# Patient Record
Sex: MALE | Employment: FULL TIME | URBAN - METROPOLITAN AREA
[De-identification: names, ages, dates, MRNs, and addresses within clinical notes are randomized per-mention and may not be internally consistent; named-entity substitution may affect disease eponyms.]

---

## 2024-07-05 ENCOUNTER — VIRTUAL VISIT (OUTPATIENT)
Dept: PEDIATRICS | Facility: CLINIC | Age: 44
End: 2024-07-05
Payer: COMMERCIAL

## 2024-07-05 DIAGNOSIS — J06.9 UPPER RESPIRATORY TRACT INFECTION, UNSPECIFIED TYPE: Primary | ICD-10-CM

## 2024-07-05 PROCEDURE — 99203 OFFICE O/P NEW LOW 30 MIN: CPT | Mod: 93 | Performed by: STUDENT IN AN ORGANIZED HEALTH CARE EDUCATION/TRAINING PROGRAM

## 2024-07-05 RX ORDER — IPRATROPIUM BROMIDE 42 UG/1
2 SPRAY, METERED NASAL 4 TIMES DAILY
Qty: 5 ML | Refills: 0 | Status: SHIPPED | OUTPATIENT
Start: 2024-07-05 | End: 2024-07-12

## 2024-07-05 NOTE — PROGRESS NOTES
Ryne is a 44 year old who is being evaluated via a billable telephone visit.    What phone number would you like to be contacted at? [phone]  How would you like to obtain your AVS? Mail a copy  Originating Location (pt. Location): Home    Distant Location (provider location):  On-site    Assessment & Plan     Upper respiratory tract infection, unspecified type  Prensents today for evaluation of cough, congestion and conjunctivitis after recent travels.  Symptoms most consistent with URI.  No red fld symptoms.  No fevers, shortness of breath.  Discussed supportive cares and otc medication recommendation.  Also discussed addition of atrovent nasal spray to help with nasal congestion and associated headache  - ipratropium (ATROVENT) 0.06 % nasal spray; Spray 2 sprays into both nostrils 4 times daily for 7 days              Subjective   Ryne is a 44 year old, presenting for the following health issues:  Sinus Problem    History of Present Illness       Reason for visit:  Sinus problem    He eats 4 or more servings of fruits and vegetables daily.He consumes 0 sweetened beverage(s) daily.He exercises with enough effort to increase his heart rate 30 to 60 minutes per day.  He exercises with enough effort to increase his heart rate 7 days per week.   He is taking medications regularly.       Last week in [place] doing a service project    Driving back Friday sore throat  Monday sinus pressure  Wednesday eye drainage   Cough since Friday or Saturday   Pretty consistent throughout the day    No fevers  Does feel generally somewhat achy  No shortness of breath  Taking ibuprofen 200 and tylenol 500 one each which have been helpful but not in the past day.  4 hours apart.    Notes increased frontal sinus pressure   No ear pain   No issues breathing through nose     He did not take a covid test     One of his kids is also showing symptoms of illness                 Objective           Vitals:  No vitals were obtained today  due to virtual visit.    Physical Exam   General: Alert and no distress //Respiratory: No audible wheeze, cough, or shortness of breath // Psychiatric:  Appropriate affect, tone, and pace of words            Phone call duration: 15 minutes  Signed Electronically by: Franci Ryan MD

## 2024-07-05 NOTE — PATIENT INSTRUCTIONS
Sorry you aren't feeling well.  I have sent a prescription for atrovent nasal spray to your pharmacy.  Please let us know if symptoms are getting worse or are not getting better.

## 2024-07-11 ENCOUNTER — MYC MEDICAL ADVICE (OUTPATIENT)
Dept: PEDIATRICS | Facility: CLINIC | Age: 44
End: 2024-07-11
Payer: COMMERCIAL

## 2024-07-11 NOTE — TELEPHONE ENCOUNTER
I would not expect atrovent nasal spray to cause those symptoms, this seems unrelated. Would recommend additional triage vs visit for new concern.    Thank you,  Franci Ryan MD

## 2024-07-11 NOTE — TELEPHONE ENCOUNTER
Dr. Ryan,    Please see [facility] message from pt and advise     Thank you  Sanjeev Ruvalcaba RN on 7/11/2024 at 2:54 PM

## 2024-07-12 ENCOUNTER — OFFICE VISIT (OUTPATIENT)
Dept: URGENT CARE | Facility: URGENT CARE | Age: 44
End: 2024-07-12
Payer: COMMERCIAL

## 2024-07-12 VITALS
BODY MASS INDEX: 22.16 KG/M2 | OXYGEN SATURATION: 100 % | TEMPERATURE: 97.2 F | DIASTOLIC BLOOD PRESSURE: 88 MMHG | WEIGHT: 182 LBS | SYSTOLIC BLOOD PRESSURE: 132 MMHG | RESPIRATION RATE: 17 BRPM | HEIGHT: 76 IN | HEART RATE: 65 BPM

## 2024-07-12 DIAGNOSIS — J01.90 ACUTE SINUSITIS WITH SYMPTOMS > 10 DAYS: ICD-10-CM

## 2024-07-12 DIAGNOSIS — R07.0 THROAT PAIN: Primary | ICD-10-CM

## 2024-07-12 DIAGNOSIS — J06.9 VIRAL URI: ICD-10-CM

## 2024-07-12 LAB
DEPRECATED S PYO AG THROAT QL EIA: NEGATIVE
GROUP A STREP BY PCR: NOT DETECTED

## 2024-07-12 PROCEDURE — 99213 OFFICE O/P EST LOW 20 MIN: CPT | Performed by: FAMILY MEDICINE

## 2024-07-12 PROCEDURE — 87651 STREP A DNA AMP PROBE: CPT | Performed by: FAMILY MEDICINE

## 2024-07-12 RX ORDER — DOXYCYCLINE 100 MG/1
100 CAPSULE ORAL 2 TIMES DAILY
Qty: 14 CAPSULE | Refills: 0 | Status: SHIPPED | OUTPATIENT
Start: 2024-07-12 | End: 2024-07-19

## 2024-07-12 NOTE — PROGRESS NOTES
Chief Complaint   Patient presents with    Urgent Care    Sinus Problem     2 weeks of sinus pressure and cough was given Arovent has used the last dose today with no relief.      Ryne was seen today for urgent care and sinus problem.    Diagnoses and all orders for this visit:    Throat pain  -     Streptococcus A Rapid Screen w/Reflex to PCR  -     Group A Streptococcus PCR Throat Swab    Acute sinusitis with symptoms > 10 days  -     doxycycline hyclate (VIBRAMYCIN) 100 MG capsule; Take 1 capsule (100 mg) by mouth 2 times daily for 7 days    Viral URI    Continue decongestant with antihistamine medication such as claritin D then do saline nasal spray wait 10 minutes and then do the Flonase  If any worsening  pain or high fever do follow up   Reviewed results with patient in details   As allergic to penicillin will treat with doxy for the sinus infection   Results for orders placed or performed in visit on 07/12/24   Streptococcus A Rapid Screen w/Reflex to PCR     Status: Normal    Specimen: Throat; Swab   Result Value Ref Range    Group A Strep antigen Negative Negative       D/d- uri, sinusitis ,pneumonia , covid     SUBJECTIVE:  Ryne Smith is a 44 year old male here with concerns about sinus infection and sorethroat and thick post nasal drip and cough.  He states onset of symptoms were 2 week(s) ago.  He has had maxillary, frontal pressure. Course of illness is worsening. Severity moderate Current and Associated symptoms: nasal congestion, sore throat, facial pain/pressure, headache, and post-nasal drainage  Predisposing factors include recent illness. Recent treatment has included: atrovent spray     No past medical history on file.  Social History     Tobacco Use    Smoking status: Never    Smokeless tobacco: Never   Substance Use Topics    Alcohol use: Yes     Alcohol/week: 3.0 standard drinks of alcohol     Types: 3 Standard drinks or equivalent per week       ROS:  Review of systems negative except as  "stated above.    OBJECTIVE:  /88 (BP Location: Left arm, Patient Position: Sitting, Cuff Size: Adult Large)   Pulse 65   Temp 97.2  F (36.2  C) (Tympanic)   Resp 17   Ht 1.93 m (6' 4\")   Wt 82.6 kg (182 lb)   SpO2 100%   BMI 22.15 kg/m    Exam:GENERAL APPEARANCE: healthy, alert and no distress  EYES: EOMI,  PERRL, conjunctiva clear  HENT: ear canals and TM fluid behind the TM .  Nose and mouth without ulcers, erythema or lesions  NECK: supple, nontender, no lymphadenopathy  RESP: lungs clear to auscultation - no rales, rhonchi or wheezes  CV: regular rates and rhythm, normal S1 S2, no murmur noted  PSYCH: mentation appears normal    Nelly Valdovinos MD     "

## 2024-07-12 NOTE — PATIENT INSTRUCTIONS
Continue decongestant with antihistamine medication such as claritin D then do saline nasal spray wait 10 minutes and then do the Flonase  If any worsening  pain or high fever do follow up       Nelly Valdovinos MD

## 2024-07-16 ENCOUNTER — OFFICE VISIT (OUTPATIENT)
Dept: FAMILY MEDICINE | Facility: CLINIC | Age: 44
End: 2024-07-16
Payer: COMMERCIAL

## 2024-07-16 VITALS
DIASTOLIC BLOOD PRESSURE: 90 MMHG | RESPIRATION RATE: 15 BRPM | BODY MASS INDEX: 22.14 KG/M2 | TEMPERATURE: 98.1 F | SYSTOLIC BLOOD PRESSURE: 137 MMHG | OXYGEN SATURATION: 98 % | WEIGHT: 181.8 LBS | HEART RATE: 67 BPM | HEIGHT: 76 IN

## 2024-07-16 DIAGNOSIS — R53.83 FATIGUE, UNSPECIFIED TYPE: ICD-10-CM

## 2024-07-16 DIAGNOSIS — J06.9 UPPER RESPIRATORY TRACT INFECTION, UNSPECIFIED TYPE: Primary | ICD-10-CM

## 2024-07-16 LAB
BASOPHILS # BLD AUTO: 0 10E3/UL (ref 0–0.2)
BASOPHILS NFR BLD AUTO: 1 %
EOSINOPHIL # BLD AUTO: 0.1 10E3/UL (ref 0–0.7)
EOSINOPHIL NFR BLD AUTO: 1 %
ERYTHROCYTE [DISTWIDTH] IN BLOOD BY AUTOMATED COUNT: 11.7 % (ref 10–15)
HCT VFR BLD AUTO: 48.3 % (ref 40–53)
HGB BLD-MCNC: 16.6 G/DL (ref 13.3–17.7)
IMM GRANULOCYTES # BLD: 0 10E3/UL
IMM GRANULOCYTES NFR BLD: 0 %
LYMPHOCYTES # BLD AUTO: 2 10E3/UL (ref 0.8–5.3)
LYMPHOCYTES NFR BLD AUTO: 34 %
MCH RBC QN AUTO: 28.9 PG (ref 26.5–33)
MCHC RBC AUTO-ENTMCNC: 34.4 G/DL (ref 31.5–36.5)
MCV RBC AUTO: 84 FL (ref 78–100)
MONOCYTES # BLD AUTO: 0.4 10E3/UL (ref 0–1.3)
MONOCYTES NFR BLD AUTO: 6 %
MONOCYTES NFR BLD AUTO: NEGATIVE %
NEUTROPHILS # BLD AUTO: 3.3 10E3/UL (ref 1.6–8.3)
NEUTROPHILS NFR BLD AUTO: 58 %
PLATELET # BLD AUTO: 179 10E3/UL (ref 150–450)
RBC # BLD AUTO: 5.75 10E6/UL (ref 4.4–5.9)
WBC # BLD AUTO: 5.7 10E3/UL (ref 4–11)

## 2024-07-16 PROCEDURE — 85025 COMPLETE CBC W/AUTO DIFF WBC: CPT | Performed by: FAMILY MEDICINE

## 2024-07-16 PROCEDURE — 36415 COLL VENOUS BLD VENIPUNCTURE: CPT | Performed by: FAMILY MEDICINE

## 2024-07-16 PROCEDURE — 99214 OFFICE O/P EST MOD 30 MIN: CPT | Performed by: FAMILY MEDICINE

## 2024-07-16 PROCEDURE — 86308 HETEROPHILE ANTIBODY SCREEN: CPT | Performed by: FAMILY MEDICINE

## 2024-07-16 PROCEDURE — 84443 ASSAY THYROID STIM HORMONE: CPT | Performed by: FAMILY MEDICINE

## 2024-07-16 RX ORDER — AZITHROMYCIN 250 MG/1
TABLET, FILM COATED ORAL
Qty: 6 TABLET | Refills: 0 | Status: SHIPPED | OUTPATIENT
Start: 2024-07-16 | End: 2024-07-21

## 2024-07-16 ASSESSMENT — PAIN SCALES - GENERAL: PAINLEVEL: MODERATE PAIN (4)

## 2024-07-16 NOTE — PROGRESS NOTES
Assessment & Plan     Upper respiratory tract infection, unspecified type  Will add azithromycin given return of symptoms, continue Doxycycline through course.  Follow up if not improving.  - azithromycin (ZITHROMAX) 250 MG tablet; Take 2 tablets (500 mg) by mouth daily for 1 day, THEN 1 tablet (250 mg) daily for 4 days.    Fatigue, unspecified type  Will check labs for abnormalities, recommendations pending results.  - CBC with platelets and differential; Future  - TSH with free T4 reflex; Future  - Mononucleosis screen; Future  - CBC with platelets and differential  - TSH with free T4 reflex  - Mononucleosis screen    See Patient Instructions    Jed Michele is a 44 year old, presenting for the following health issues:  Hospital F/U      7/16/2024     3:44 PM   Additional Questions   Roomed by Liliana Lu     History of Present Illness       Reason for visit:  Sinus problem    He eats 4 or more servings of fruits and vegetables daily.He consumes 0 sweetened beverage(s) daily.He exercises with enough effort to increase his heart rate 30 to 60 minutes per day.  He exercises with enough effort to increase his heart rate 7 days per week.   He is taking medications regularly.         ED/UC Followup:    Facility:  [facility]  Date of visit: 7/12/24  Reason for visit: URI / Sinus Problem  Current Status: No Change  Acute Illness  Acute illness concerns: Sinus problem/URI  Onset/Duration: 2 weeks  Symptoms:  Fever: YES- possibly lo grade fever on saturday  Chills/Sweats: YES  Headache (location?): YES  Sinus Pressure: YES  Conjunctivitis:  YES  Ear Pain: no  Rhinorrhea: YES- at first but not currently  Congestion: YES  Sore Throat: YES  Cough: YES-non-productive  Wheeze: No  Decreased Appetite: YES  Nausea: No  Vomiting: No  Diarrhea: No  Dysuria/Freq.: No  Dysuria or Hematuria: No  Fatigue/Achiness: YES  Sick/Strep Exposure: YES- possibly kids / Strep Test taken at urgent care was  "negative.  Therapies tried and outcome: advil, tylenol    Did find Tick on 6/29/24 on lower left back - no rash or bullseye noticed then or since.     Did virtual visit with provider, given Atrovent nasal spray, helped for a few days, then went backwards.  Sinus pressure and major fatigue present now.  Went to urgent care a week later, started on Doxycycline.  Seemed like he was getting better, but yesterday started worsening again.        Objective    BP (!) 137/90 (BP Location: Right arm, Patient Position: Sitting, Cuff Size: Adult Regular)   Pulse 67   Temp 98.1  F (36.7  C) (Oral)   Resp 15   Ht 1.93 m (6' 4\")   Wt 82.5 kg (181 lb 12.8 oz)   SpO2 98%   BMI 22.13 kg/m    Body mass index is 22.13 kg/m .  Physical Exam   GENERAL: alert and no distress  EYES: Eyes grossly normal to inspection, PERRL and conjunctivae and sclerae normal  HENT: normal cephalic/atraumatic, ear canals and TM's normal, nasal mucosa edematous , oropharynx clear, and oral mucous membranes moist  NECK: no adenopathy  RESP: lungs clear to auscultation - no rales, rhonchi or wheezes  CV: regular rates and rhythm  PSYCH: mentation appears normal, affect normal/bright    Labs reviewed in chart        Signed Electronically by: Deandra Mazariegos MD    "

## 2024-07-17 LAB — TSH SERPL DL<=0.005 MIU/L-ACNC: 2.31 UIU/ML (ref 0.3–4.2)

## 2024-07-19 ENCOUNTER — MYC MEDICAL ADVICE (OUTPATIENT)
Dept: FAMILY MEDICINE | Facility: CLINIC | Age: 44
End: 2024-07-19
Payer: COMMERCIAL

## 2024-07-24 ENCOUNTER — TELEPHONE (OUTPATIENT)
Dept: FAMILY MEDICINE | Facility: CLINIC | Age: 44
End: 2024-07-24
Payer: COMMERCIAL

## 2024-07-24 NOTE — TELEPHONE ENCOUNTER
I would have him wait until he can be seen by ENT.  May use saline nasal spray and rinses PRN, can start Flonase, one spray per nostril once daily until visit.

## 2024-07-24 NOTE — TELEPHONE ENCOUNTER
See telephone encounter dated 7/24. The telephone encounter was routed to PCP. Closing encounter.     Bety LEBRON RN   [facility]

## 2024-07-24 NOTE — TELEPHONE ENCOUNTER
Pt calls.    He was seen recently by Dr. Eliecer Mazariegos.  Starting this morning, his sinus pressure and fatigue have started to increase.  He has also a dizziness which is what he had before being seen at the  and being seen by Dr. Eliecer Mazariegos.  No other symptoms.      He has a follow up appt with ENT on 8/1/24.      Will forward to Dr. Eliecer Mazariegos for advisal.  Do you want him to be seen again?

## 2024-07-25 NOTE — TELEPHONE ENCOUNTER
RN called and spoke with pt   -Relayed recommendation from Dr. Eliecer Mazariegos (see previous note)  -Discussed symptom management at home   -ENT appt scheduled 8/1/24  -Instructed pt to call back if new/worsening symptoms or if further questions/concerns     Patient was given an opportunity to ask questions, verbalized understanding of plan, and is agreeable.     Shanell CROFT RN  Patient Advocate Liaison - PAL RN  [facility]

## 2024-07-28 ENCOUNTER — HEALTH MAINTENANCE LETTER (OUTPATIENT)
Age: 44
End: 2024-07-28

## 2024-12-05 ENCOUNTER — TELEPHONE (OUTPATIENT)
Dept: FAMILY MEDICINE | Facility: CLINIC | Age: 44
End: 2024-12-05
Payer: COMMERCIAL

## 2024-12-05 NOTE — TELEPHONE ENCOUNTER
Patient Quality Outreach    Patient is due for the following:       Topic Date Due    Hepatitis B Vaccine (1 of 3 - 19+ 3-dose series) Never done    Diptheria Tetanus Pertussis (DTAP/TDAP/TD) Vaccine (1 - Tdap) Never done    Flu Vaccine (1) Never done    COVID-19 Vaccine (3 - 2024-25 season) 09/01/2024       Action(s) Taken:   Schedule a nurse only visit for vaccines    Type of outreach:    Sent [employer] message.    Questions for provider review:    None           Christi Fields, CMA

## 2025-02-26 ENCOUNTER — MYC MEDICAL ADVICE (OUTPATIENT)
Dept: INTERNAL MEDICINE | Facility: CLINIC | Age: 45
End: 2025-02-26
Payer: COMMERCIAL

## 2025-06-29 ENCOUNTER — HEALTH MAINTENANCE LETTER (OUTPATIENT)
Age: 45
End: 2025-06-29